# Patient Record
Sex: MALE | Race: WHITE | Employment: STUDENT | URBAN - METROPOLITAN AREA
[De-identification: names, ages, dates, MRNs, and addresses within clinical notes are randomized per-mention and may not be internally consistent; named-entity substitution may affect disease eponyms.]

---

## 2020-12-31 ENCOUNTER — COMMUNICATION - HEALTHEAST (OUTPATIENT)
Dept: SCHEDULING | Facility: CLINIC | Age: 25
End: 2020-12-31

## 2021-05-29 ENCOUNTER — RECORDS - HEALTHEAST (OUTPATIENT)
Dept: ADMINISTRATIVE | Facility: CLINIC | Age: 26
End: 2021-05-29

## 2021-06-14 NOTE — TELEPHONE ENCOUNTER
"Mom calling and avelina gives verbal permission to speak with her.  She is reporting that \"his throat is closing up.\" and he is having difficulty swallowing saliva and needs to spit it out . He was able to just drink some fluids and each some pasta, but in between is needing to spit out the saliva.    When patient tries to speak in the background, his voice sounds very funny, like he can't     Advised that Swollen tonsils, can hear patient having difficulty making words that can be understood very well.    Advised that patient needs to be seen today.    COVID 19 Nurse Triage Plan/Patient Instructions    Please be aware that novel coronavirus (COVID-19) may be circulating in the community. If you develop symptoms such as fever, cough, or SOB or if you have concerns about the presence of another infection including coronavirus (COVID-19), please contact your health care provider or visit [email or web address].     Disposition/Instructions    ED Visit recommended. Follow protocol based instructions.      Bring Your Own Device:  Please also bring your smart device(s) (smart phones, tablets, laptops) and their charging cables for your personal use and to communicate with your care team during your visit.      Thank you for taking steps to prevent the spread of this virus.  o Limit your contact with others.  o Wear a simple mask to cover your cough.  o Wash your hands well and often.    Resources    [facility]: About COVID-19: [email or web address]    CDC: What to Do If You're Sick: www.cdc.gov/coronavirus/2019-ncov/about/steps-when-sick.html    CDC: Ending Home Isolation: www.cdc.gov/coronavirus/2019-ncov/hcp/disposition-in-home-patients.html     CDC: Caring for Someone: www.cdc.gov/coronavirus/2019-ncov/if-you-are-sick/care-for-someone.html     [facility]: Interim Guidance for Hospital Discharge to Home: www.health.[facility].mn.us/diseases/coronavirus/hcp/hospdischarge.pdf    [facility] clinical trials " (COVID-19 research studies): [email or web address]-clinical-trials     Below are the COVID-19 hotlines at the [place] Department of [facility] ([facility]). Interpreters are available.   o For health questions: Call 221-234-6345 or 1-335.896.6913 (7 a.m. to 7 p.m.)  o For questions about schools and childcare: Call 771-524-4655 or 1-870.895.7789 (7 a.m. to 7 p.m.)       Susan Locke RN 12/31/20 4:15 PM               Additional Information    Negative: SEVERE difficulty breathing (e.g., struggling for each breath, speaks in single words)    Negative: Sounds like a life-threatening emergency to the triager    Negative: Throat culture results, call about    Negative: Productive cough is the main symptom    Negative: Runny nose is the main symptom    Drooling or spitting out saliva (because can't swallow)    Protocols used: SORE THROAT-A-OH

## 2022-07-20 ENCOUNTER — OFFICE VISIT (OUTPATIENT)
Dept: FAMILY MEDICINE | Facility: CLINIC | Age: 27
End: 2022-07-20
Payer: COMMERCIAL

## 2022-07-20 VITALS
OXYGEN SATURATION: 98 % | HEIGHT: 73 IN | DIASTOLIC BLOOD PRESSURE: 60 MMHG | SYSTOLIC BLOOD PRESSURE: 100 MMHG | BODY MASS INDEX: 20.81 KG/M2 | WEIGHT: 157 LBS | HEART RATE: 87 BPM

## 2022-07-20 DIAGNOSIS — Z01.818 PREOPERATIVE EXAMINATION: Primary | ICD-10-CM

## 2022-07-20 DIAGNOSIS — Z23 ENCOUNTER FOR IMMUNIZATION: ICD-10-CM

## 2022-07-20 DIAGNOSIS — J36 TONSILLAR ABSCESS: ICD-10-CM

## 2022-07-20 DIAGNOSIS — Z11.52 ENCOUNTER FOR SCREENING FOR COVID-19: ICD-10-CM

## 2022-07-20 LAB — HGB BLD-MCNC: 15.8 G/DL (ref 13.3–17.7)

## 2022-07-20 PROCEDURE — 90715 TDAP VACCINE 7 YRS/> IM: CPT | Performed by: FAMILY MEDICINE

## 2022-07-20 PROCEDURE — 99204 OFFICE O/P NEW MOD 45 MIN: CPT | Mod: 25 | Performed by: FAMILY MEDICINE

## 2022-07-20 PROCEDURE — 36415 COLL VENOUS BLD VENIPUNCTURE: CPT | Performed by: FAMILY MEDICINE

## 2022-07-20 PROCEDURE — 90471 IMMUNIZATION ADMIN: CPT | Performed by: FAMILY MEDICINE

## 2022-07-20 PROCEDURE — 85018 HEMOGLOBIN: CPT | Performed by: FAMILY MEDICINE

## 2022-07-20 ASSESSMENT — PAIN SCALES - GENERAL: PAINLEVEL: NO PAIN (0)

## 2022-07-20 NOTE — PROGRESS NOTES
[facility]  [address] [facility] [address]  [place] [address]  Phone: [phone]  Fax: [phone]  Primary Provider: Karina Mott  Pre-op Performing Provider: KARINA MOTT      PREOPERATIVE EVALUATION:  Today's date: 7/20/2022    Ricardo Hilario is a 26 year old male who presents for a preoperative evaluation.    Surgical Information:  Surgery/Procedure: Tonsillectomy  Surgery Location: [place] [facility]  Surgeon: Dr. Vasquez  Surgery Date: 8/3/22  Time of Surgery: TBD  Where patient plans to recover: At home with family  Fax number for surgical facility: [phone]    Type of Anesthesia Anticipated: to be determined    Preoperative examination  Preop exam completed.  Needs hemoglobin completed prior to surgery.  No other contraindication to scheduled surgery identified.  - Hemoglobin    Tonsillar abscess  Describes history of recurrent peritonsillar abscess.  Currently asymptomatic.  Schedule tonsillectomy as noted.    Encounter for immunization  Adacel booster provided.  - TDAP VACCINE (Adacel, Boostrix)    Encounter for screening for COVID-19  Will complete COVID-19 PCR testing fewer than 5 days prior to scheduled surgery per requirement.  - Asymptomatic COVID-19 Virus (Coronavirus) by PCR       Subjective     HPI related to upcoming procedure: Preoperative examination completed today.  Recurrent tonsillar abscess described by patient.  Scheduled bilateral tonsillectomy with Dr. Guthrie on August 3, 2022.  Needs hemoglobin as well as COVID-19 PCR testing fewer than 5 days prior to surgery.  Past medical social and family history reviewed.  Currently doing well without evidence for tonsillar infection, fever, pharyngitis, etc.  Comprehensive review of systems as above otherwise all negative.    Single   Live with S.O. (she moved from [place], [place])   No children   Tobacco:  none   EtOH:  beer 3-4 times per week after work   Mom - Yumiko   Dad - Robert   1 older bro - Hernan    Surgeries:  wisdom teeth x 4   Hospitalizations:  none   [employer] - [employer]   Work:   [employer] on [address]   Hobbies:  working on cars; playing video games on PC    Preop Questions 7/20/2022   1. Have you ever had a heart attack or stroke? No   2. Have you ever had surgery on your heart or blood vessels, such as a stent placement, a coronary artery bypass, or surgery on an artery in your head, neck, heart, or legs? No   3. Do you have chest pain with activity? No   4. Do you have a history of  heart failure? No   5. Do you currently have a cold, bronchitis or symptoms of other infection? No   6. Do you have a cough, shortness of breath, or wheezing? No   7. Do you or anyone in your family have previous history of blood clots? No   8. Do you or does anyone in your family have a serious bleeding problem such as prolonged bleeding following surgeries or cuts? No   9. Have you ever had problems with anemia or been told to take iron pills? No   10. Have you had any abnormal blood loss such as black, tarry or bloody stools? No   11. Have you ever had a blood transfusion? No   12. Are you willing to have a blood transfusion if it is medically needed before, during, or after your surgery? Yes   13. Have you or any of your relatives ever had problems with anesthesia? No   14. Do you have sleep apnea, excessive snoring or daytime drowsiness? No   15. Do you have any artifical heart valves or other implanted medical devices like a pacemaker, defibrillator, or continuous glucose monitor? No   16. Do you have artificial joints? No   17. Are you allergic to latex? No       Health Care Directive:  Patient does not have a Health Care Directive or Living Will: Discussed advance care planning with patient; however, patient declined at this time.    Preoperative Review of :   reviewed - no record of controlled substances prescribed.      Status of Chronic Conditions:  See problem list for active medical  "problems.  Problems all longstanding and stable, except as noted/documented.  See ROS for pertinent symptoms related to these conditions.      Review of Systems  CONSTITUTIONAL: NEGATIVE for fever, chills, change in weight  ENT/MOUTH: NEGATIVE for ear, mouth and throat problems  RESP: NEGATIVE for significant cough or SOB  CV: NEGATIVE for chest pain, palpitations or peripheral edema    Patient Active Problem List    Diagnosis Date Noted     Acne Vulgaris      Priority: Medium     Created by Conversion          History reviewed. No pertinent past medical history.  History reviewed. No pertinent surgical history.  No current outpatient medications on file.       No Known Allergies     Social History     Tobacco Use     Smoking status: Never Smoker     Smokeless tobacco: Never Used   Substance Use Topics     Alcohol use: Not on file     History reviewed. No pertinent family history.  History   Drug Use Not on file         Objective     /60   Pulse 87   Ht 1.842 m (6' 0.5\")   Wt 71.2 kg (157 lb)   SpO2 98%   BMI 21.00 kg/m      Physical Exam  GENERAL APPEARANCE: healthy, alert and no distress  HENT: ear canals and TM's normal and nose and mouth without ulcers or lesions  RESP: lungs clear to auscultation - no rales, rhonchi or wheezes  CV: regular rate and rhythm, normal S1 S2, no S3 or S4 and no murmur, click or rub   ABDOMEN: soft, nontender, no HSM or masses and bowel sounds normal  NEURO: Normal strength and tone, sensory exam grossly normal, mentation intact and speech normal    No results for input(s): HGB, PLT, INR, NA, POTASSIUM, CR, A1C in the last 50667 hours.     Diagnostics:  Labs pending at this time.  Results will be reviewed when available.  Recent Results (from the past 24 hour(s))   Hemoglobin    Collection Time: 07/20/22  3:51 PM   Result Value Ref Range    Hemoglobin 15.8 13.3 - 17.7 g/dL      No EKG required, no history of coronary heart disease, significant arrhythmia, peripheral " arterial disease or other structural heart disease.    Revised Cardiac Risk Index (RCRI):  The patient has the following serious cardiovascular risks for perioperative complications:   - No serious cardiac risks = 0 points     RCRI Interpretation: 0 points: Class I (very low risk - 0.4% complication rate)           Signed Electronically by: Bharathi Mott MD  Copy of this evaluation report is provided to requesting physician.

## 2022-07-20 NOTE — LETTER
July 20, 2022      Ricardo Hilario  [address] [address]        Dear ,    We are writing to inform you of your test results.    Your hemoglobin result was normal.  No evidence for anemia.    Resulted Orders   Hemoglobin   Result Value Ref Range    Hemoglobin 15.8 13.3 - 17.7 g/dL       If you have any questions or concerns, please call the clinic at the number listed above.       Sincerely,      Bharathi Mott MD

## 2022-08-01 ENCOUNTER — LAB (OUTPATIENT)
Dept: LAB | Facility: CLINIC | Age: 27
End: 2022-08-01
Payer: COMMERCIAL

## 2022-08-01 DIAGNOSIS — Z11.52 ENCOUNTER FOR SCREENING FOR COVID-19: ICD-10-CM

## 2022-08-01 PROCEDURE — U0003 INFECTIOUS AGENT DETECTION BY NUCLEIC ACID (DNA OR RNA); SEVERE ACUTE RESPIRATORY SYNDROME CORONAVIRUS 2 (SARS-COV-2) (CORONAVIRUS DISEASE [COVID-19]), AMPLIFIED PROBE TECHNIQUE, MAKING USE OF HIGH THROUGHPUT TECHNOLOGIES AS DESCRIBED BY CMS-2020-01-R: HCPCS

## 2022-08-01 PROCEDURE — U0005 INFEC AGEN DETEC AMPLI PROBE: HCPCS

## 2022-08-02 LAB — SARS-COV-2 RNA RESP QL NAA+PROBE: NEGATIVE
